# Patient Record
Sex: FEMALE | Race: WHITE | NOT HISPANIC OR LATINO | URBAN - METROPOLITAN AREA
[De-identification: names, ages, dates, MRNs, and addresses within clinical notes are randomized per-mention and may not be internally consistent; named-entity substitution may affect disease eponyms.]

---

## 2020-10-10 ENCOUNTER — EMERGENCY (EMERGENCY)
Facility: HOSPITAL | Age: 25
LOS: 1 days | Discharge: HOME | End: 2020-10-10
Attending: EMERGENCY MEDICINE | Admitting: EMERGENCY MEDICINE
Payer: COMMERCIAL

## 2020-10-10 VITALS
DIASTOLIC BLOOD PRESSURE: 85 MMHG | TEMPERATURE: 99 F | RESPIRATION RATE: 20 BRPM | HEIGHT: 64 IN | WEIGHT: 139.99 LBS | OXYGEN SATURATION: 95 % | HEART RATE: 125 BPM | SYSTOLIC BLOOD PRESSURE: 135 MMHG

## 2020-10-10 VITALS
SYSTOLIC BLOOD PRESSURE: 133 MMHG | RESPIRATION RATE: 18 BRPM | TEMPERATURE: 98 F | HEART RATE: 110 BPM | DIASTOLIC BLOOD PRESSURE: 84 MMHG | OXYGEN SATURATION: 97 %

## 2020-10-10 PROCEDURE — 82962 GLUCOSE BLOOD TEST: CPT

## 2020-10-10 PROCEDURE — 99284 EMERGENCY DEPT VISIT MOD MDM: CPT

## 2020-10-10 PROCEDURE — 93010 ELECTROCARDIOGRAM REPORT: CPT | Mod: NC

## 2020-10-10 PROCEDURE — 93005 ELECTROCARDIOGRAM TRACING: CPT

## 2020-10-10 PROCEDURE — 99285 EMERGENCY DEPT VISIT HI MDM: CPT

## 2020-10-10 NOTE — ED ADULT TRIAGE NOTE - CHIEF COMPLAINT QUOTE
as per EMS and [employer] patient was buzzing and buzzing to get into an apartment and was unable to and did not have the keys to her apartment. Pt states "I have been drinking with my friends".

## 2020-10-10 NOTE — ED PROVIDER NOTE - EYES, MLM
Injected bilaterally, pupils equal, round and reactive to light. Injected bilaterally pupils equal, round and reactive to light.

## 2020-10-10 NOTE — ED PROVIDER NOTE - PSYCHIATRIC, MLM
Alert and oriented to person, place, time/situation. normal mood and affect. no apparent risk to self or others. Alert and oriented to person, place. normal mood and affect. no apparent risk to self or others.

## 2020-10-10 NOTE — ED ADULT NURSE NOTE - OBJECTIVE STATEMENT
24 y/o female BIBEMS for evaluation. As per EMS patient was "ringing the bell of various apartments at a building" and the residents called the police. Pt was found to be intoxicated by police and she was sent to the ED. Pt is awake, states "I just want to sleep" Pt appears drowsy. 24 y/o female BIBEMS for evaluation. As per EMS patient was "ringing the bell of various apartments at a building" and the residents called the police. Pt was found to be intoxicated by police and she was sent to the ED. Pt is awake, states "I just want to sleep" Pt appears drowsy. Pt admits to have been drinking Vodka.

## 2020-10-10 NOTE — ED PROVIDER NOTE - CLINICAL SUMMARY MEDICAL DECISION MAKING FREE TEXT BOX
24 y/o F with hx bipolar/anxiety/depression BIBEMS for inebriation after ringing on doors in her apartment building. Patient admits to drinking but denies any other drug use, no physical complaints and denies trauma. Appears inebriated with slurred speech. Will observe to sobriety and d/c. 24 y/o F with hx bipolar/anxiety/depression BIBEMS for inebriation after ringing on doors in her apartment building. Patient admits to drinking tequila, but denies any other drug use, no physical complaints and denies trauma. Appears inebriated with slurred speech. Will observe to sobriety and d/c.  ED course: VS noted. Pt initially tachycardic to 120s which improved while in ED. Pt appears anxious. ECG with sinus tachycardia, FSBG 98. Pt observed in ED until clinically sober. Ambulating steadily in ED and without complaints. Dcd with outpt f/up. Advised not to drink alcohol in excess.

## 2020-10-10 NOTE — ED PROVIDER NOTE - NEUROLOGICAL, MLM
Alert and oriented, no focal deficits, no motor or sensory deficits. Alert and oriented to person and place, no focal deficits

## 2020-10-10 NOTE — ED PROVIDER NOTE - NSFOLLOWUPCLINICS_GEN_ALL_ED_FT
[facility]  Family Medicine  [address]  [place], [place] [address]  Phone: [phone]  Fax:   Follow Up Time: 4-6 Days

## 2020-10-10 NOTE — ED PROVIDER NOTE - OBJECTIVE STATEMENT
26 y/o F with PMHx of bipolar disorder, depression, anxiety, BIBEMS after [employer] was [place] after patient was buzzing different apartments in building. Patient states she was drinking today with friends, "a bunch of tequila". Denies falls, injuries no other complaints, denies drug use. Per triage note [employer] was called because patient wa sbuzzing numerous apartments in building, [employer] confirmed this was the building the patient lived in but she was without her keys and looked inebriated, so EMS brought to ED. No complaints, wants to go home, denies injury/falls/cp/sob or any SI or HI or hallucinations. FS 98. 26 y/o F with PMHx of bipolar disorder, depression, anxiety, BIBEMS after [employer] was called after patient was buzzing different apartments in a building. Patient states she was drinking today with friends, "a bunch of tequila". Denies falls, injuries no other complaints, denies drug use. Per triage note [employer] was called because patient was buzzing numerous apartments in building, [employer] confirmed this was the building the patient lived in but she was without her keys and looked inebriated, so EMS brought pt to ED. No complaints, wants to go home, denies injury/falls/cp/sob or any SI or HI or hallucinations. FS 98.

## 2020-10-10 NOTE — ED PROVIDER NOTE - NSFOLLOWUPINSTRUCTIONS_ED_ALL_ED_FT
Please follow up with your primary care doctor in 3-4 days. Please do not drink alcohol in excess.     Alcohol Abuse    Alcohol intoxication occurs when the amount of alcohol that a person has consumed impairs his or her ability to mentally and physically function. Chronic alcohol consumption can also lead to a variety of health issues including neurological disease, stomach disease, heart disease, liver disease, etc. Do not drive after drinking alcohol. Drinking enough alcohol to end up in an Emergency Room suggests you may have an alcohol abuse problem. Seek help at a drug addiction center.    SEEK IMMEDIATE MEDICAL CARE IF YOU HAVE ANY OF THE FOLLOWING SYMPTOMS: seizures, vomiting blood, blood in your stool, lightheadedness/dizziness, or becoming shaky to tremulous when you stop drinking.

## 2020-10-10 NOTE — ED ADULT NURSE NOTE - NSIMPLEMENTINTERV_GEN_ALL_ED
Implemented All Universal Safety Interventions:  [place] to call system. Call bell, personal items and telephone within reach. Instruct patient to call for assistance. Room bathroom lighting operational. Non-slip footwear when patient is off stretcher. Physically safe environment: no spills, clutter or unnecessary equipment. Stretcher in lowest position, wheels locked, appropriate side rails in place.

## 2020-10-10 NOTE — ED ADULT NURSE NOTE - CHPI ED NUR SYMPTOMS NEG
no dizziness/no fever/no change in level of consciousness/no blurred vision/no vomiting/no nausea/no numbness/no weakness/no loss of consciousness

## 2020-10-10 NOTE — ED PROVIDER NOTE - CONSTITUTIONAL, MLM
normal... Appears inebriated with slurred speech. Awake, alert, oriented to person, place, time/situation and in no apparent distress. Appears inebriated with slurred speech. Awake, alert, oriented to person, place and in no apparent distress.

## 2020-10-10 NOTE — ED PROVIDER NOTE - PATIENT PORTAL LINK FT
You can access the FollowMyHealth Patient Portal offered by [facility] by registering at the following website: [email or web address]. By joining [employer]’s FollowMyHealth portal, you will also be able to view your health information using other applications (apps) compatible with our system.

## 2020-10-14 DIAGNOSIS — R41.82 ALTERED MENTAL STATUS, UNSPECIFIED: ICD-10-CM

## 2020-10-14 DIAGNOSIS — F10.129 ALCOHOL ABUSE WITH INTOXICATION, UNSPECIFIED: ICD-10-CM

## 2022-05-25 NOTE — ED ADULT NURSE NOTE - NS_NURSE_DISC_TEACHING_YN_ED_ALL_ED
What Type Of Note Output Would You Prefer (Optional)?: Standard Output What Is The Reason For Today's Visit?: Full Body Skin Examination Yes

## 2023-01-04 NOTE — ED PROVIDER NOTE - AGGRAVATING FACTORS
[FreeTextEntry1] : reassurance\par \par cautions advised\par RTO PRN advised on signs and symptoms requiring re evaluation and concern.\par  none